# Patient Record
Sex: FEMALE | Race: WHITE | URBAN - METROPOLITAN AREA
[De-identification: names, ages, dates, MRNs, and addresses within clinical notes are randomized per-mention and may not be internally consistent; named-entity substitution may affect disease eponyms.]

---

## 2018-05-17 ENCOUNTER — HOSPITAL ENCOUNTER (OUTPATIENT)
Dept: LAB | Age: 73
Discharge: HOME | End: 2018-05-17
Payer: COMMERCIAL

## 2018-05-17 ENCOUNTER — OFFICE VISIT (OUTPATIENT)
Dept: FAMILY MEDICINE CLINIC | Age: 73
End: 2018-05-17

## 2018-05-17 VITALS
WEIGHT: 157 LBS | TEMPERATURE: 96.7 F | SYSTOLIC BLOOD PRESSURE: 122 MMHG | HEIGHT: 59 IN | RESPIRATION RATE: 20 BRPM | BODY MASS INDEX: 31.65 KG/M2 | DIASTOLIC BLOOD PRESSURE: 84 MMHG | OXYGEN SATURATION: 96 % | HEART RATE: 77 BPM

## 2018-05-17 DIAGNOSIS — H93.13 TINNITUS OF BOTH EARS: ICD-10-CM

## 2018-05-17 DIAGNOSIS — K21.9 GASTROESOPHAGEAL REFLUX DISEASE, ESOPHAGITIS PRESENCE NOT SPECIFIED: ICD-10-CM

## 2018-05-17 DIAGNOSIS — E78.5 HYPERLIPIDEMIA, UNSPECIFIED HYPERLIPIDEMIA TYPE: ICD-10-CM

## 2018-05-17 DIAGNOSIS — M81.0 AGE-RELATED OSTEOPOROSIS WITHOUT CURRENT PATHOLOGICAL FRACTURE: ICD-10-CM

## 2018-05-17 DIAGNOSIS — Z11.59 NEED FOR HEPATITIS C SCREENING TEST: ICD-10-CM

## 2018-05-17 DIAGNOSIS — F51.01 PRIMARY INSOMNIA: ICD-10-CM

## 2018-05-17 DIAGNOSIS — F41.9 ANXIETY: ICD-10-CM

## 2018-05-17 DIAGNOSIS — E53.8 VITAMIN B12 DEFICIENCY: ICD-10-CM

## 2018-05-17 DIAGNOSIS — K21.9 GASTROESOPHAGEAL REFLUX DISEASE, ESOPHAGITIS PRESENCE NOT SPECIFIED: Primary | ICD-10-CM

## 2018-05-17 DIAGNOSIS — J43.9 PULMONARY EMPHYSEMA, UNSPECIFIED EMPHYSEMA TYPE (HCC): ICD-10-CM

## 2018-05-17 DIAGNOSIS — H91.93 BILATERAL HEARING LOSS, UNSPECIFIED HEARING LOSS TYPE: ICD-10-CM

## 2018-05-17 LAB
ALBUMIN SERPL-MCNC: 4 G/DL (ref 3.4–5)
ALBUMIN/GLOB SERPL: 1.4 {RATIO} (ref 0.8–1.7)
ALP SERPL-CCNC: 71 U/L (ref 45–117)
ALT SERPL-CCNC: 23 U/L (ref 13–56)
ANION GAP SERPL CALC-SCNC: 6 MMOL/L (ref 3–18)
APPEARANCE UR: CLEAR
AST SERPL-CCNC: 16 U/L (ref 15–37)
BASOPHILS # BLD: 0 K/UL (ref 0–0.06)
BASOPHILS NFR BLD: 0 % (ref 0–2)
BILIRUB SERPL-MCNC: 0.7 MG/DL (ref 0.2–1)
BILIRUB UR QL: NEGATIVE
BUN SERPL-MCNC: 9 MG/DL (ref 7–18)
BUN/CREAT SERPL: 16 (ref 12–20)
CALCIUM SERPL-MCNC: 9.2 MG/DL (ref 8.5–10.1)
CHLORIDE SERPL-SCNC: 107 MMOL/L (ref 100–108)
CHOLEST SERPL-MCNC: 140 MG/DL
CO2 SERPL-SCNC: 28 MMOL/L (ref 21–32)
COLOR UR: YELLOW
CREAT SERPL-MCNC: 0.57 MG/DL (ref 0.6–1.3)
DIFFERENTIAL METHOD BLD: ABNORMAL
EOSINOPHIL # BLD: 0.1 K/UL (ref 0–0.4)
EOSINOPHIL NFR BLD: 1 % (ref 0–5)
ERYTHROCYTE [DISTWIDTH] IN BLOOD BY AUTOMATED COUNT: 13.3 % (ref 11.6–14.5)
GLOBULIN SER CALC-MCNC: 2.8 G/DL (ref 2–4)
GLUCOSE SERPL-MCNC: 100 MG/DL (ref 74–99)
GLUCOSE UR STRIP.AUTO-MCNC: NEGATIVE MG/DL
HCT VFR BLD AUTO: 40.9 % (ref 35–45)
HDLC SERPL-MCNC: 47 MG/DL (ref 40–60)
HDLC SERPL: 3 {RATIO} (ref 0–5)
HGB BLD-MCNC: 12.8 G/DL (ref 12–16)
HGB UR QL STRIP: NEGATIVE
KETONES UR QL STRIP.AUTO: NEGATIVE MG/DL
LDLC SERPL CALC-MCNC: 70 MG/DL (ref 0–100)
LEUKOCYTE ESTERASE UR QL STRIP.AUTO: NEGATIVE
LIPID PROFILE,FLP: NORMAL
LYMPHOCYTES # BLD: 1.2 K/UL (ref 0.9–3.6)
LYMPHOCYTES NFR BLD: 28 % (ref 21–52)
MCH RBC QN AUTO: 29.5 PG (ref 24–34)
MCHC RBC AUTO-ENTMCNC: 31.3 G/DL (ref 31–37)
MCV RBC AUTO: 94.2 FL (ref 74–97)
MONOCYTES # BLD: 0.3 K/UL (ref 0.05–1.2)
MONOCYTES NFR BLD: 7 % (ref 3–10)
NEUTS SEG # BLD: 2.6 K/UL (ref 1.8–8)
NEUTS SEG NFR BLD: 64 % (ref 40–73)
NITRITE UR QL STRIP.AUTO: NEGATIVE
PH UR STRIP: 8 [PH] (ref 5–8)
PLATELET # BLD AUTO: 367 K/UL (ref 135–420)
PMV BLD AUTO: 10.9 FL (ref 9.2–11.8)
POTASSIUM SERPL-SCNC: 4.1 MMOL/L (ref 3.5–5.5)
PROT SERPL-MCNC: 6.8 G/DL (ref 6.4–8.2)
PROT UR STRIP-MCNC: NEGATIVE MG/DL
RBC # BLD AUTO: 4.34 M/UL (ref 4.2–5.3)
SODIUM SERPL-SCNC: 141 MMOL/L (ref 136–145)
SP GR UR REFRACTOMETRY: 1.01 (ref 1–1.03)
TRIGL SERPL-MCNC: 115 MG/DL (ref ?–150)
TSH SERPL DL<=0.05 MIU/L-ACNC: 1.34 UIU/ML (ref 0.36–3.74)
UROBILINOGEN UR QL STRIP.AUTO: 1 EU/DL (ref 0.2–1)
VIT B12 SERPL-MCNC: 1766 PG/ML (ref 211–911)
VLDLC SERPL CALC-MCNC: 23 MG/DL
WBC # BLD AUTO: 4.1 K/UL (ref 4.6–13.2)

## 2018-05-17 PROCEDURE — 83013 H PYLORI (C-13) BREATH: CPT | Performed by: INTERNAL MEDICINE

## 2018-05-17 PROCEDURE — 81003 URINALYSIS AUTO W/O SCOPE: CPT | Performed by: INTERNAL MEDICINE

## 2018-05-17 PROCEDURE — 80061 LIPID PANEL: CPT | Performed by: INTERNAL MEDICINE

## 2018-05-17 PROCEDURE — 86803 HEPATITIS C AB TEST: CPT | Performed by: INTERNAL MEDICINE

## 2018-05-17 PROCEDURE — 84443 ASSAY THYROID STIM HORMONE: CPT | Performed by: INTERNAL MEDICINE

## 2018-05-17 PROCEDURE — 85025 COMPLETE CBC W/AUTO DIFF WBC: CPT | Performed by: INTERNAL MEDICINE

## 2018-05-17 PROCEDURE — 82607 VITAMIN B-12: CPT | Performed by: INTERNAL MEDICINE

## 2018-05-17 PROCEDURE — 36415 COLL VENOUS BLD VENIPUNCTURE: CPT | Performed by: INTERNAL MEDICINE

## 2018-05-17 PROCEDURE — 80053 COMPREHEN METABOLIC PANEL: CPT | Performed by: INTERNAL MEDICINE

## 2018-05-17 PROCEDURE — 82306 VITAMIN D 25 HYDROXY: CPT | Performed by: INTERNAL MEDICINE

## 2018-05-17 RX ORDER — ALPRAZOLAM 0.5 MG/1
0.5 TABLET ORAL
COMMUNITY
End: 2018-05-17 | Stop reason: SDUPTHER

## 2018-05-17 RX ORDER — GUAIFENESIN 100 MG/5ML
81 LIQUID (ML) ORAL DAILY
COMMUNITY

## 2018-05-17 RX ORDER — PANTOPRAZOLE SODIUM 40 MG/1
40 TABLET, DELAYED RELEASE ORAL DAILY
COMMUNITY
End: 2018-05-17

## 2018-05-17 RX ORDER — FLUTICASONE PROPIONATE 50 MCG
2 SPRAY, SUSPENSION (ML) NASAL DAILY
COMMUNITY
End: 2018-05-24 | Stop reason: SDUPTHER

## 2018-05-17 RX ORDER — CHROMIUM PICOLINATE 200 MCG
TABLET ORAL
COMMUNITY
End: 2018-05-17

## 2018-05-17 RX ORDER — MELATONIN
DAILY
COMMUNITY

## 2018-05-17 RX ORDER — GLUCOSAM/CHONDRO/HERB 149/HYAL 750-100 MG
1 TABLET ORAL DAILY
COMMUNITY

## 2018-05-17 RX ORDER — SIMVASTATIN 20 MG/1
20 TABLET, FILM COATED ORAL
Qty: 90 TAB | Refills: 1 | Status: SHIPPED | OUTPATIENT
Start: 2018-05-17 | End: 2018-11-08 | Stop reason: SDUPTHER

## 2018-05-17 RX ORDER — SIMVASTATIN 20 MG/1
TABLET, FILM COATED ORAL
COMMUNITY
End: 2018-05-17 | Stop reason: SDUPTHER

## 2018-05-17 RX ORDER — LANOLIN ALCOHOL/MO/W.PET/CERES
1000 CREAM (GRAM) TOPICAL DAILY
COMMUNITY
End: 2018-05-31 | Stop reason: ALTCHOICE

## 2018-05-17 RX ORDER — ALPRAZOLAM 0.5 MG/1
0.5 TABLET ORAL
Qty: 60 TAB | Refills: 2 | Status: SHIPPED | OUTPATIENT
Start: 2018-05-17 | End: 2018-05-31 | Stop reason: SDUPTHER

## 2018-05-17 RX ORDER — FLUTICASONE PROPIONATE AND SALMETEROL 250; 50 UG/1; UG/1
1 POWDER RESPIRATORY (INHALATION) EVERY 12 HOURS
COMMUNITY
End: 2018-07-23 | Stop reason: SDUPTHER

## 2018-05-17 RX ORDER — RANITIDINE 150 MG/1
150 TABLET, FILM COATED ORAL 2 TIMES DAILY
COMMUNITY
End: 2018-05-31

## 2018-05-17 RX ORDER — GABAPENTIN 100 MG/1
100 CAPSULE ORAL
COMMUNITY
End: 2018-07-06 | Stop reason: SDUPTHER

## 2018-05-17 NOTE — MR AVS SNAPSHOT
[address] [place] [id number] 
[phone] Patient: Timbo Kwong MRN: [id number] SIDRA:[birth year] Visit Information Date & Time Provider Department Dept. Phone Encounter #  
 5/17/2018 12:45 PM Monserrat Schilling, [address] [facility] [phone] Follow-up Instructions Return in about 2 weeks (around 5/31/2018) for rov. Upcoming Health Maintenance Date Due Hepatitis C Screening 1945 DTaP/Tdap/Td series (1 - Tdap) 8/1/1966 FOBT Q 1 YEAR AGE 50-75 8/1/1995 ZOSTER VACCINE AGE 60> 6/1/2005 GLAUCOMA SCREENING Q2Y 8/1/2010 Pneumococcal 65+ Low/Medium Risk (1 of 2 - PCV13) 8/1/2010 BREAST CANCER SCRN MAMMOGRAM 12/20/2012 Influenza Age 5 to Adult 8/1/2018 Allergies as of 5/17/2018  Review Complete On: 5/17/2018 By: Monserrat Schilling MD  
  
 Severity Noted Reaction Type Reactions Hydrocodone  05/17/2018    Nausea and Vomiting Penicillin G  05/17/2018    Swelling Current Immunizations  Never Reviewed No immunizations on file. Not reviewed this visit You Were Diagnosed With   
  
 Codes Comments Gastroesophageal reflux disease, esophagitis presence not specified    -  Primary ICD-10-CM: K21.9 ICD-9-CM: 530.81 Pulmonary emphysema, unspecified emphysema type ([facility] [address]     ICD-10-CM: J43.9 ICD-9-CM: 492.8 Anxiety     ICD-10-CM: F41.9 ICD-9-CM: 300.00 Primary insomnia     ICD-10-CM: F51.01 
ICD-9-CM: 307.42 Hyperlipidemia, unspecified hyperlipidemia type     ICD-10-CM: E78.5 ICD-9-CM: 272.4 Need for hepatitis C screening test     ICD-10-CM: Z11.59 
ICD-9-CM: V73.89 Age-related osteoporosis without current pathological fracture     ICD-10-CM: M81.0 ICD-9-CM: 733.01 Vitamin B12 deficiency     ICD-10-CM: E53.8 ICD-9-CM: 266.2  Tinnitus of both ears     ICD-10-CM: H93.13 
ICD-9-CM: 388.30   
 Bilateral hearing loss, unspecified hearing loss type     ICD-10-CM: H91.93 
ICD-9-CM: 389. 9 Vitals BP Pulse Temp Resp Height(growth percentile) Weight(growth percentile) 122/84 77 96.7 °F (35.9 °C) (Oral) 20 4' 11\" (1.499 m) 157 lb (71.2 kg) SpO2 BMI OB Status Smoking Status 96% 31.71 kg/m2 Hysterectomy Never Smoker BMI and BSA Data Body Mass Index Body Surface Area 31.71 kg/m 2 1.72 m 2 Preferred Pharmacy Pharmacy Name Phone [id number] [address], [address] [facility] [phone] Your Updated Medication List  
  
   
This list is accurate as of 5/17/18  1:31 PM.  Always use your most recent med list.  
  
  
  
  
 Theanmolbert Bucy 250-50 mcg/dose diskus inhaler Generic drug:  fluticasone-salmeterol Take 1 Puff by inhalation every twelve (12) hours. ALPRAZolam 0.5 mg tablet Commonly known as:  Gisele Stacie Take 1 Tab by mouth two (2) times daily as needed for Anxiety. Max Daily Amount: 1 mg.  
  
 aspirin 81 mg chewable tablet Take 81 mg by mouth daily. CALCIUM 600 PO Take  by mouth. COMBIVENT RESPIMAT  mcg/actuation inhaler Generic drug:  ipratropium-albuterol Take 1 Puff by inhalation every six (6) hours. FISH OIL 1,000 mg (120 mg-180 mg) capsule Generic drug:  omega 3-DHA-EPA-fish oil Take 1 Cap by mouth daily. fluticasone 50 mcg/actuation nasal spray Commonly known as:  Caroline Sale 2 Sprays by Both Nostrils route daily. gabapentin 100 mg capsule Commonly known as:  NEURONTIN Take 100 mg by mouth nightly. raNITIdine 150 mg tablet Commonly known as:  ZANTAC Take 150 mg by mouth two (2) times a day. simvastatin 20 mg tablet Commonly known as:  ZOCOR Take 1 Tab by mouth nightly. SPIRIVA WITH HANDIHALER 18 mcg inhalation capsule Generic drug:  tiotropium Take 1 Cap by inhalation daily. VITAMIN B-12 1,000 mcg tablet Generic drug:  cyanocobalamin Take 1,000 mcg by mouth daily. VITAMIN D3 1,000 unit tablet Generic drug:  cholecalciferol Take  by mouth daily. Prescriptions Printed Refills ALPRAZolam (XANAX) 0.5 mg tablet 2 Sig: Take 1 Tab by mouth two (2) times daily as needed for Anxiety. Max Daily Amount: 1 mg. Class: Print Route: Oral  
  
Prescriptions Sent to Pharmacy Refills  
 simvastatin (ZOCOR) 20 mg tablet 1 Sig: Take 1 Tab by mouth nightly. Class: Normal  
 Pharmacy: [address] [place] [address], [address] [place] [address], 29  #: [phone] Route: Oral  
  
Follow-up Instructions Return in about 2 weeks (around 5/31/2018) for rov. To-Do List   
 05/17/2018 Lab:  CBC WITH AUTOMATED DIFF   
  
 05/17/2018 Lab:  H. PYLORI BREATH TEST   
  
 05/17/2018 Lab:  HCV AB W/RFLX TO NARCISO   
  
 05/17/2018 Lab:  LIPID PANEL   
  
 05/17/2018 Lab:  METABOLIC PANEL, COMPREHENSIVE   
  
 05/17/2018 Lab:  TSH 3RD GENERATION Around 05/17/2018 Lab:  URINALYSIS W/ RFLX MICROSCOPIC   
  
 05/17/2018 Lab:  VITAMIN B12   
  
 05/17/2018 Lab:  VITAMIN D, 25 HYDROXY Introducing [facility]! Nathaly [facility] introduces My[employer] patient portal. Now you can access parts of your medical record, email your doctor's office, and request medication refills online. 1. In your internet browser, go to [email or web address] 2. Click on the First Time User? Click Here link in the Sign In box. You will see the New Member Sign Up page. 3. Enter your My[employer] Access Code exactly as it appears below. You will not need to use this code after youve completed the sign-up process. If you do not sign up before the expiration date, you must request a new code. · [employer] Access Code: [email or web address] Expires: 8/15/2018  1:27 PM 
 
4.  Enter the last four digits of your Social Security Number (xxxx) and Date of Birth (mm/dd/yyyy) as indicated and click Submit. You will be taken to the next sign-up page. 5. Create a [employer] ID. This will be your [employer] login ID and cannot be changed, so think of one that is secure and easy to remember. 6. Create a [employer] password. You can change your password at any time. 7. Enter your Password Reset Question and Answer. This can be used at a later time if you forget your password. 8. Enter your e-mail address. You will receive e-mail notification when new information is available in [address] 9. Click Sign Up. You can now view and download portions of your medical record. 10. Click the Download Summary menu link to download a portable copy of your medical information. If you have questions, please visit the Frequently Asked Questions section of the [employer] website. Remember, [employer] is NOT to be used for urgent needs. For medical emergencies, dial 911. Now available from your iPhone and Android! Please provide this summary of care documentation to your next provider. Your primary care clinician is listed as [place]. If you have any questions after today's visit, please call [phone].

## 2018-05-17 NOTE — PROGRESS NOTES
History of Present Illness  Charley Fernández is a 67 y.o. female who presents today for management of    Chief Complaint   Patient presents with   [address] [facility] [address] Cholesterol Problem       Patient is here to establish care. She moved from [place] 2 months ago. She currently lives with her son. Anxiety Review:  Patient is seen for anxiety disorder. Current treatment includes benzodiazepines and no other therapies. Ongoing symptoms include: insomnia. Patient denies: chest pain, shortness of breath, suicidal ideation. Reported side effects from the treatment: none. GERD  Patient complains of gastroesophageal reflux with belching. Symptoms have been present for several years. She denies dysphagia. She  has not lost weight. She denies melena, hematochezia, hematemesis, and coffee ground emesis. She denies abdominal bloating, choking on food, difficulty swallowing, dysphagia and hoarseness. Medical therapy in the past has included proton pump inhibitors, OTC H2 antagonists. COPD Review:  The patient is being seen for follow up of COPD. Oxygen: She currently is not on home oxygen therapy. Symptoms: acute dyspnea: severity = not present: course since onset of episode: well controlled  able walk 5 blocks  cough: severity: mild: sputum : clear: light. Patient uses 2 pillows at night. Patient does not smoke cigarettes. Osteoporosis / Osteopenia  She is an 67 y.o. female who presents with Osteoporosis. She has not had history of fracture. Bone density is up to date and report is not available at this time. Patient does not exercise daily. Family history is positive for osteoporosis. Patient does not smoke. Previous treatment calcium with vitamin D 1200mg daily. Tinnitus  Syeda Roque [place] complains of tinnitus in the both ears that  is related to the hearing loss. She was evaluated by an audiologist back in [address] [place] [address]. She could not afford hearing aids.     She was hospitalized in [place] 2 months ago for chest pain. Work-up was negative as reported by the patient. She takes gabapentin but is unsure for what reason. Past Medical History  Past Medical History:   Diagnosis Date    Anxiety     Emphysema lung (HCC)     GERD (gastroesophageal reflux disease)     Insomnia     Kidney stone     Osteoporosis     Varicose vein of leg         Surgical History  Past Surgical History:   Procedure Laterality Date    HX LITHOTRIPSY      HX VEIN STRIPPING Left         Current Medications  Current Outpatient Prescriptions   Medication Sig    gabapentin (NEURONTIN) 100 mg capsule Take 100 mg by mouth nightly.  aspirin 81 mg chewable tablet Take 81 mg by mouth daily.  cyanocobalamin (VITAMIN B-12) 1,000 mcg tablet Take 1,000 mcg by mouth daily.  fluticasone-salmeterol (ADVAIR DISKUS) 250-50 mcg/dose diskus inhaler Take 1 Puff by inhalation every twelve (12) hours.  tiotropium (SPIRIVA WITH HANDIHALER) 18 mcg inhalation capsule Take 1 Cap by inhalation daily.  fluticasone (FLONASE) 50 mcg/actuation nasal spray 2 Sprays by Both Nostrils route daily.  ipratropium-albuterol (COMBIVENT RESPIMAT)  mcg/actuation inhaler Take 1 Puff by inhalation every six (6) hours.  raNITIdine (ZANTAC) 150 mg tablet Take 150 mg by mouth two (2) times a day.  omega 3-DHA-EPA-fish oil (FISH OIL) 1,000 mg (120 mg-180 mg) capsule Take 1 Cap by mouth daily.  calcium carbonate (CALCIUM 600 PO) Take  by mouth.  cholecalciferol (VITAMIN D3) 1,000 unit tablet Take  by mouth daily.  ALPRAZolam (XANAX) 0.5 mg tablet Take 1 Tab by mouth two (2) times daily as needed for Anxiety. Max Daily Amount: 1 mg.  simvastatin (ZOCOR) 20 mg tablet Take 1 Tab by mouth nightly. No current facility-administered medications for this visit. Allergies/Drug Reactions  Allergies   Allergen Reactions    Hydrocodone Nausea and Vomiting    Penicillin G Swelling        Family History  No family history on file. Social History  Social History     Social History    Marital status:      Spouse name: N/A    Number of children: N/A    Years of education: N/A     Occupational History    Not on file. Social History Main Topics    Smoking status: Never Smoker    Smokeless tobacco: Never Used    Alcohol use No    Drug use: No    Sexual activity: No     Other Topics Concern    Not on file     Social History Narrative    No narrative on file       Health Maintenance   Topic Date Due    Hepatitis C Screening  1945    DTaP/Tdap/Td series (1 - Tdap) 08/01/1966    FOBT Q 1 YEAR AGE 50-75  08/01/1995    ZOSTER VACCINE AGE 60>  06/01/2005    GLAUCOMA SCREENING Q2Y  08/01/2010    Pneumococcal 65+ Low/Medium Risk (1 of 2 - PCV13) 08/01/2010    BREAST CANCER SCRN MAMMOGRAM  12/20/2012    Influenza Age 9 to Adult  08/01/2018    Bone Densitometry (Dexa) Screening  Completed       There is no immunization history on file for this patient. Review of Systems  Negative except as mentioned in HPI    Physical Exam  Vital signs:   Vitals:    05/17/18 1254   BP: 122/84   Pulse: 77   Resp: 20   Temp: 96.7 °F (35.9 °C)   TempSrc: Oral   SpO2: 96%   Weight: 157 lb (71.2 kg)   Height: 4' 11\" (1.499 m)       General: alert, oriented, not in distress  Head: scalp normal, atraumatic  Neck: supple, no JVD, no lymphadenopathy, non-palpable thyroid  Chest/Lungs: clear breath sounds, no wheezing or crackles  Heart: normal rate, regular rhythm, no murmur  Abdomen: soft, non-distended, non-tender, normal bowel sounds, no organomegaly, no masses  Extremities: no focal deformities, no edema  Skin: no active skin lesions    Laboratory/Tests:  Routine labs ordered    Assessment/Plan:    1. Gastroesophageal reflux disease, esophagitis presence not specified  - stop Prilosec  - continue Zantac 150mg BID  - H. PYLORI BREATH TEST; Future    2.  Pulmonary emphysema, unspecified emphysema type ([facility] [address]  - stable  - continue Advair, Spiriva, prn Combivent    3. Anxiety  - continue alprazolam 0.5nmg BID    4. Primary insomnia  - continue alprazolam QHS    5. Hyperlipidemia, unspecified hyperlipidemia type  - control uncertain  - continue simvastatin 20mg QHS  - CBC WITH AUTOMATED DIFF; Future  - LIPID PANEL; Future  - METABOLIC PANEL, COMPREHENSIVE; Future  - TSH 3RD GENERATION; Future  - URINALYSIS W/ RFLX MICROSCOPIC; Future    6. Need for hepatitis C screening test  - HCV AB W/RFLX TO NARCISO; Future    7. Age-related osteoporosis without current pathological fracture  - VITAMIN D, 25 HYDROXY; Future    8. Vitamin B12 deficiency  - VITAMIN B12; Future    Get records from previous PCP in [place]      Follow-up Disposition:  Return in about 2 weeks (around 5/31/2018) for rov. Total visit time was 45 minutes of which more than 50% was devoted to counseling and coordination of care. I have discussed the diagnosis with the patient and the intended plan as seen in the above orders. The patient has received an after-visit summary and questions were answered concerning future plans. I have discussed medication side effects and warnings with the patient as well. I have reviewed the plan of care with the patient, accepted their input and they are in agreement with the treatment goals.        Allen Li MD  May 17, 2018

## 2018-05-18 LAB
25(OH)D3 SERPL-MCNC: 39 NG/ML (ref 30–100)
HCV AB S/CO SERPL IA: <0.1 S/CO RATIO (ref 0–0.9)
HCV AB SERPL QL IA: NORMAL
UREA BREATH TEST QL: NEGATIVE

## 2018-05-24 DIAGNOSIS — J44.9 CHRONIC OBSTRUCTIVE PULMONARY DISEASE, UNSPECIFIED COPD TYPE (HCC): Primary | ICD-10-CM

## 2018-05-24 DIAGNOSIS — J30.9 ALLERGIC RHINITIS, UNSPECIFIED SEASONALITY, UNSPECIFIED TRIGGER: ICD-10-CM

## 2018-05-24 RX ORDER — FLUTICASONE PROPIONATE 50 MCG
2 SPRAY, SUSPENSION (ML) NASAL DAILY
Qty: 1 BOTTLE | Refills: 2 | Status: SHIPPED | OUTPATIENT
Start: 2018-05-24 | End: 2018-09-18 | Stop reason: SDUPTHER

## 2018-05-24 NOTE — TELEPHONE ENCOUNTER
Requested Prescriptions     Pending Prescriptions Disp Refills    tiotropium (SPIRIVA WITH HANDIHALER) 18 mcg inhalation capsule 30 Cap      Sig: Take 1 Cap by inhalation daily.  fluticasone (FLONASE) 50 mcg/actuation nasal spray 1 Bottle      Si Sprays by Both Nostrils route daily.      Last office visit: 18  Next office visit: 18

## 2018-05-31 ENCOUNTER — OFFICE VISIT (OUTPATIENT)
Dept: FAMILY MEDICINE CLINIC | Age: 73
End: 2018-05-31

## 2018-05-31 VITALS
SYSTOLIC BLOOD PRESSURE: 138 MMHG | TEMPERATURE: 98 F | RESPIRATION RATE: 20 BRPM | HEIGHT: 59 IN | OXYGEN SATURATION: 98 % | BODY MASS INDEX: 31.04 KG/M2 | HEART RATE: 75 BPM | DIASTOLIC BLOOD PRESSURE: 90 MMHG | WEIGHT: 154 LBS

## 2018-05-31 DIAGNOSIS — J43.9 PULMONARY EMPHYSEMA, UNSPECIFIED EMPHYSEMA TYPE (HCC): Primary | ICD-10-CM

## 2018-05-31 DIAGNOSIS — Z79.899 CHRONICALLY ON BENZODIAZEPINE THERAPY: ICD-10-CM

## 2018-05-31 DIAGNOSIS — M81.0 AGE-RELATED OSTEOPOROSIS WITHOUT CURRENT PATHOLOGICAL FRACTURE: ICD-10-CM

## 2018-05-31 DIAGNOSIS — K21.9 GASTROESOPHAGEAL REFLUX DISEASE, ESOPHAGITIS PRESENCE NOT SPECIFIED: ICD-10-CM

## 2018-05-31 DIAGNOSIS — Z79.899 CONTROLLED SUBSTANCE AGREEMENT SIGNED: ICD-10-CM

## 2018-05-31 DIAGNOSIS — I10 ESSENTIAL HYPERTENSION: ICD-10-CM

## 2018-05-31 DIAGNOSIS — F41.9 ANXIETY: ICD-10-CM

## 2018-05-31 DIAGNOSIS — F51.01 PRIMARY INSOMNIA: ICD-10-CM

## 2018-05-31 RX ORDER — PANTOPRAZOLE SODIUM 40 MG/1
40 TABLET, DELAYED RELEASE ORAL DAILY
COMMUNITY
End: 2018-06-28 | Stop reason: SDUPTHER

## 2018-05-31 RX ORDER — ALPRAZOLAM 0.5 MG/1
0.5 TABLET ORAL
Qty: 60 TAB | Refills: 0
Start: 2018-05-31

## 2018-05-31 RX ORDER — LOSARTAN POTASSIUM 100 MG/1
100 TABLET ORAL DAILY
COMMUNITY

## 2018-05-31 RX ORDER — MONTELUKAST SODIUM 10 MG/1
10 TABLET ORAL DAILY
COMMUNITY
End: 2018-07-23 | Stop reason: SDUPTHER

## 2018-05-31 NOTE — PROGRESS NOTES
1. Have you been to the ER, urgent care clinic since your last visit? Hospitalized since your last visit? No    2. Have you seen or consulted any other health care providers outside of the [address] [place] [address] since your last visit? Include any pap smears or colon screening.  No

## 2018-05-31 NOTE — PROGRESS NOTES
History of Present Illness  Isidro Ortega is a 67 y.o. female who presents today for management of    Chief Complaint   Patient presents with    COPD    GERD     Fall  Patient reports of falling on her right side few days ago while putting a box in a garbage can. She fell while stepping on a cardboard. She had right wrist swelling which has complete resolved. She has persistent pain on her right forearm. No history of prior falls. Anxiety Review:  Patient is seen for anxiety disorder. Current treatment includes benzodiazepines and no other therapies. Ongoing symptoms include: insomnia. Patient denies: chest pain, shortness of breath, suicidal ideation. Reported side effects from the treatment: none. Failed therapies for insomnia: benadryl, melatonin     GERD  Patient complains of gastroesophageal reflux with belching. Symptoms have been present for several years. She denies dysphagia. She  has not lost weight. She denies melena, hematochezia, hematemesis, and coffee ground emesis. She denies abdominal bloating, choking on food, difficulty swallowing, dysphagia and hoarseness. Medical therapy in the past has included proton pump inhibitors, OTC H2 antagonists.     COPD Review:  The patient is being seen for follow up of COPD. Oxygen: She currently is not on home oxygen therapy. Symptoms: acute dyspnea: severity = not present: course since onset of episode: well controlled  able walk 5 blocks  cough: severity: mild: sputum : clear: light. Patient uses 2 pillows at night. Patient does not smoke cigarettes.     Osteoporosis / Osteopenia  She is an 67 y.o. female who presents with Osteoporosis. She has not had history of fracture. Bone density is up to date and report is not available at this time. Patient does not exercise daily. Family history is positive for osteoporosis. Patient does not smoke. Previous treatment calcium with vitamin D 1200mg daily.     Cardiovascular Review:  The patient has hypertension and hyperlipidemia. Diet and Lifestyle: generally follows a low fat low cholesterol diet, generally follows a low sodium diet, sedentary, nonsmoker  Home BP Monitoring: is not measured at home. Pertinent ROS: taking medications as instructed, no medication side effects noted, no TIA's, no chest pain on exertion, no dyspnea on exertion, no swelling of ankles. Problem List  Patient Active Problem List    Diagnosis Date Noted    Primary insomnia 05/17/2018    Gastroesophageal reflux disease 05/17/2018    Pulmonary emphysema ([address] 05/17/2018    Anxiety 05/17/2018    Age-related osteoporosis without current pathological fracture 05/17/2018    Bilateral hearing loss 05/17/2018    Tinnitus of both ears 05/17/2018       Past Medical History  Past Medical History:   Diagnosis Date    Anxiety     Emphysema lung ([address]     GERD (gastroesophageal reflux disease)     Insomnia     Kidney stone     Osteoporosis     Varicose vein of leg         Surgical History  Past Surgical History:   Procedure Laterality Date    HX LITHOTRIPSY      HX VEIN STRIPPING Left         Current Medications  Current Outpatient Prescriptions   Medication Sig    losartan (COZAAR) 100 mg tablet Take 100 mg by mouth daily.  montelukast (SINGULAIR) 10 mg tablet Take 10 mg by mouth daily.  ALPRAZolam (XANAX) 0.5 mg tablet Take 1 Tab by mouth nightly as needed for Sleep. Max Daily Amount: 0.5 mg.    tiotropium (SPIRIVA WITH HANDIHALER) 18 mcg inhalation capsule Take 1 Cap by inhalation daily.  fluticasone (FLONASE) 50 mcg/actuation nasal spray 2 Sprays by Both Nostrils route daily.  gabapentin (NEURONTIN) 100 mg capsule Take 100 mg by mouth nightly. Indications: TMJ    aspirin 81 mg chewable tablet Take 81 mg by mouth daily.  fluticasone-salmeterol (ADVAIR DISKUS) 250-50 mcg/dose diskus inhaler Take 1 Puff by inhalation every twelve (12) hours.     ipratropium-albuterol (COMBIVENT RESPIMAT)  mcg/actuation inhaler Take 1 Puff by inhalation every six (6) hours.  omega 3-DHA-EPA-fish oil (FISH OIL) 1,000 mg (120 mg-180 mg) capsule Take 1 Cap by mouth daily.  calcium carbonate (CALCIUM 600 PO) Take  by mouth.  cholecalciferol (VITAMIN D3) 1,000 unit tablet Take  by mouth daily.  simvastatin (ZOCOR) 20 mg tablet Take 1 Tab by mouth nightly.  pantoprazole (PROTONIX) 40 mg tablet Take 40 mg by mouth daily. No current facility-administered medications for this visit. Allergies/Drug Reactions  Allergies   Allergen Reactions    Hydrocodone Nausea and Vomiting    Penicillin G Swelling        Family History  No family history on file. Social History  Social History     Social History    Marital status:      Spouse name: N/A    Number of children: N/A    Years of education: N/A     Occupational History    Not on file.      Social History Main Topics    Smoking status: Never Smoker    Smokeless tobacco: Never Used    Alcohol use No    Drug use: No    Sexual activity: No     Other Topics Concern    Not on file     Social History Narrative    Nickname: Red       Review of Systems  Negative except as mentioned in HPI      Physical Exam  Vital signs:   Vitals:    05/31/18 1058 05/31/18 1115   BP: 151/82 138/90   Pulse: 75    Resp: 20    Temp: 98 °F (36.7 °C)    TempSrc: Oral    SpO2: 98%    Weight: 154 lb (69.9 kg)    Height: 4' 11\" (1.499 m)        General: alert, oriented, not in distress  Chest/Lungs: clear breath sounds, no wheezing or crackles  Heart: normal rate, regular rhythm, no murmur  Abdomen: soft, non-distended, non-tender, normal bowel sounds, no organomegaly, no masses  Extremities: no focal deformities, no edema    Laboratory/Tests:  Component      Latest Ref Rng & Units 5/17/2018 5/17/2018 5/17/2018           1:33 PM  1:33 PM  1:33 PM   WBC      4.6 - 13.2 K/uL   4.1 (L)   RBC      4.20 - 5.30 M/uL   4.34   HGB      12.0 - 16.0 g/dL   12.8   HCT      35.0 - 45.0 %   40.9   MCV      74.0 - 97.0 FL   94.2   MCH      24.0 - 34.0 PG   29.5   MCHC      31.0 - 37.0 g/dL   31.3   RDW      11.6 - 14.5 %   13.3   PLATELET      240 - 138 K/uL   367   MPV      9.2 - 11.8 FL   10.9   NEUTROPHILS      40 - 73 %   64   LYMPHOCYTES      21 - 52 %   28   MONOCYTES      3 - 10 %   7   EOSINOPHILS      0 - 5 %   1   BASOPHILS      0 - 2 %   0   ABS. NEUTROPHILS      1.8 - 8.0 K/UL   2.6   ABS. LYMPHOCYTES      0.9 - 3.6 K/UL   1.2   ABS. MONOCYTES      0.05 - 1.2 K/UL   0.3   ABS. EOSINOPHILS      0.0 - 0.4 K/UL   0.1   ABS. BASOPHILS      0.0 - 0.06 K/UL   0.0   DF         AUTOMATED   Cholesterol, total      <200 MG/DL  140    Triglyceride      <150 MG/DL  115    HDL Cholesterol      40 - 60 MG/DL  47    LDL, calculated      0 - 100 MG/DL  70    VLDL, calculated      MG/DL  23    CHOL/HDL Ratio      0 - 5.0    3.0    HCV Ab      0.0 - 0.9 s/co ratio <0.1       Component      Latest Ref Rng & Units 5/17/2018 5/17/2018 5/17/2018 5/17/2018           1:33 PM  1:33 PM  1:33 PM  1:33 PM   Sodium      136 - 145 mmol/L   141    Potassium      3.5 - 5.5 mmol/L   4.1    Chloride      100 - 108 mmol/L   107    CO2      21 - 32 mmol/L   28    Anion gap      3.0 - 18 mmol/L   6    Glucose      74 - 99 mg/dL   100 (H)    BUN      7.0 - 18 MG/DL   9    Creatinine      0.6 - 1.3 MG/DL   0.57 (L)    BUN/Creatinine ratio      12 - 20     16    GFR est AA      >60 ml/min/1.73m2   >60    GFR est non-AA      >60 ml/min/1.73m2   >60    Calcium      8.5 - 10.1 MG/DL   9.2    Bilirubin, total      0.2 - 1.0 MG/DL   0.7    ALT (SGPT)      13 - 56 U/L   23    AST      15 - 37 U/L   16    Alk.  phosphatase      45 - 117 U/L   71    Protein, total      6.4 - 8.2 g/dL   6.8    Albumin      3.4 - 5.0 g/dL   4.0    Globulin      2.0 - 4.0 g/dL   2.8    A-G Ratio      0.8 - 1.7     1.4    Color          YELLOW   Appearance          CLEAR   Specific gravity      1.005 - 1.030      1.010   pH (UA)      5.0 - 8.0 8.0   Protein      NEG mg/dL    NEGATIVE   Glucose      NEG mg/dL    NEGATIVE   Ketone      NEG mg/dL    NEGATIVE   Bilirubin      NEG      NEGATIVE   Blood      NEG      NEGATIVE   Urobilinogen      0.2 - 1.0 EU/dL    1.0   Nitrites      NEG      NEGATIVE   Leukocyte Esterase      NEG      NEGATIVE   TSH      0.36 - 3.74 uIU/mL  1.34     Vitamin B12      211 - 911 pg/mL 1766 (H)        Component      Latest Ref Rng & Units 5/17/2018 5/17/2018           1:33 PM  1:33 PM   Vitamin D 25-Hydroxy      30 - 100 ng/mL  39.0   H. pylori Breath Test      NEGATIVE   NEGATIVE        Assessment/Plan:    1. Pulmonary emphysema, unspecified emphysema type ([address]  - stable  - continue Advair, Spiriva and prn Combivent    2. Anxiety  - ALPRAZolam (XANAX) 0.5 mg tablet; Take 1 Tab by mouth nightly as needed for Sleep. Max Daily Amount: 0.5 mg.  Dispense: 60 Tab; Refill: 0    3. Primary insomnia  - ALPRAZolam (XANAX) 0.5 mg tablet; Take 1 Tab by mouth nightly as needed for Sleep. Max Daily Amount: 0.5 mg.  Dispense: 60 Tab; Refill: 0    4. Gastroesophageal reflux disease, esophagitis presence not specified  - stop Zantac  - stop PPI. Resume if symptoms recur. 5. Age-related osteoporosis without current pathological fracture  - continue calcium with vitamin D  - had bone density scan last year - done in [facility]    6. Controlled substance agreement signed  - [facility]; Future    7. Chronically on benzodiazepine therapy  - COMPLIANCE DRUG SCREEN/PRESCRIPTION MONITORING; Future    8. Essential hypertension  - BP fairly controlled  - continue losartan  - low salt diet      Follow-up Disposition:  Return in about 3 months (around 8/31/2018) for rov. I have discussed the diagnosis with the patient and the intended plan as seen in the above orders. The patient has received an after-visit summary and questions were answered concerning future plans.   I have discussed medication side effects and warnings with the patient as well. I have reviewed the plan of care with the patient, accepted their input and they are in agreement with the treatment goals.        Arnoldo Henao MD  May 31, 2018

## 2018-05-31 NOTE — MR AVS SNAPSHOT
[address] [place] [id number] 
[phone] Patient: Michelle Davis MRN: [id number] EGE:[birth year] Visit Information Date & Time Provider Department Dept. Phone Encounter #  
 5/31/2018 10:45 AM Luis Rosado, Filippo [facility] [phone] [id number] Follow-up Instructions Return in about 3 months (around 8/31/2018) for rov. Upcoming Health Maintenance Date Due DTaP/Tdap/Td series (1 - Tdap) 8/1/1966 FOBT Q 1 YEAR AGE 50-75 8/1/1995 ZOSTER VACCINE AGE 60> 6/1/2005 GLAUCOMA SCREENING Q2Y 8/1/2010 Pneumococcal 65+ Low/Medium Risk (1 of 2 - PCV13) 8/1/2010 BREAST CANCER SCRN MAMMOGRAM 12/20/2012 Influenza Age 5 to Adult 8/1/2018 Allergies as of 5/31/2018  Review Complete On: 5/31/2018 By: Luis Rosado MD  
  
 Severity Noted Reaction Type Reactions Hydrocodone  05/17/2018    Nausea and Vomiting Penicillin G  05/17/2018    Swelling Current Immunizations  Never Reviewed No immunizations on file. Not reviewed this visit You Were Diagnosed With   
  
 Codes Comments Pulmonary emphysema, unspecified emphysema type ([facility]    -  Primary ICD-10-CM: J43.9 ICD-9-CM: 492.8 Anxiety     ICD-10-CM: F41.9 ICD-9-CM: 300.00 Primary insomnia     ICD-10-CM: F51.01 
ICD-9-CM: 307.42 Gastroesophageal reflux disease, esophagitis presence not specified     ICD-10-CM: K21.9 ICD-9-CM: 530.81 Age-related osteoporosis without current pathological fracture     ICD-10-CM: M81.0 ICD-9-CM: 733.01 Controlled substance agreement signed     ICD-10-CM: Z79.899 ICD-9-CM: V58.69 Chronically on benzodiazepine therapy     ICD-10-CM: Z79.899 ICD-9-CM: V58.69 Essential hypertension     ICD-10-CM: I10 
ICD-9-CM: 401.9 Vitals BP Pulse Temp Resp Height(growth percentile) Weight(growth percentile) 138/90 75 98 °F (36.7 °C) (Oral) 20 4' 11\" (1.499 m) 154 lb (69.9 kg) SpO2 BMI OB Status Smoking Status 98% 31.1 kg/m2 Hysterectomy Never Smoker Vitals History BMI and BSA Data Body Mass Index Body Surface Area  
 31.1 kg/m 2 1.71 m 2 Preferred Pharmacy Pharmacy Name Phone [id number]- [id number], [address] [place] [phone] Your Updated Medication List  
  
   
This list is accurate as of 5/31/18 11:24 AM.  Always use your most recent med list.  
  
  
  
  
 [place] 250-50 mcg/dose diskus inhaler Generic drug:  fluticasone-salmeterol Take 1 Puff by inhalation every twelve (12) hours. ALPRAZolam 0.5 mg tablet Commonly known as:  Emil Batesks Take 1 Tab by mouth nightly as needed for Sleep. Max Daily Amount: 0.5 mg.  
  
 aspirin 81 mg chewable tablet Take 81 mg by mouth daily. CALCIUM 600 PO Take  by mouth. COMBIVENT RESPIMAT  mcg/actuation inhaler Generic drug:  ipratropium-albuterol Take 1 Puff by inhalation every six (6) hours. FISH OIL 1,000 mg (120 mg-180 mg) capsule Generic drug:  omega 3-DHA-EPA-fish oil Take 1 Cap by mouth daily. fluticasone 50 mcg/actuation nasal spray Commonly known as:  Francella Lacks 2 Sprays by Both Nostrils route daily. gabapentin 100 mg capsule Commonly known as:  NEURONTIN Take 100 mg by mouth nightly. Indications: TMJ  
  
 losartan 100 mg tablet Commonly known as:  COZAAR Take 100 mg by mouth daily. montelukast 10 mg tablet Commonly known as:  SINGULAIR Take 10 mg by mouth daily. pantoprazole 40 mg tablet Commonly known as:  PROTONIX Take 40 mg by mouth daily. simvastatin 20 mg tablet Commonly known as:  ZOCOR Take 1 Tab by mouth nightly. tiotropium 18 mcg inhalation capsule Commonly known as:  [address] Take 1 Cap by inhalation daily. VITAMIN D3 1,000 unit tablet Generic drug:  cholecalciferol Take  by mouth daily. Follow-up Instructions Return in about 3 months (around 8/31/2018) for rov. To-Do List   
 05/31/2018 Lab:  COMPLIANCE DRUG SCREEN/PRESCRIPTION MONITORING Introducing [facility] SERVICES! Iselakia Brooklynn introduces [employer] patient portal. Now you can access parts of your medical record, email your doctor's office, and request medication refills online. 1. In your internet browser, go to [email or web address] 2. Click on the First Time User? Click Here link in the Sign In box. You will see the New Member Sign Up page. 3. Enter your [employer] Access Code exactly as it appears below. You will not need to use this code after youve completed the sign-up process. If you do not sign up before the expiration date, you must request a new code. · [employer] Access Code: [email or web address] Expires: 8/15/2018  1:27 PM 
 
4. Enter the last four digits of your Social Security Number (xxxx) and Date of Birth (mm/dd/yyyy) as indicated and click Submit. You will be taken to the next sign-up page. 5. Create a [employer] ID. This will be your [employer] login ID and cannot be changed, so think of one that is secure and easy to remember. 6. Create a [employer] password. You can change your password at any time. 7. Enter your Password Reset Question and Answer. This can be used at a later time if you forget your password. 8. Enter your e-mail address. You will receive e-mail notification when new information is available in [address] 9. Click Sign Up. You can now view and download portions of your medical record. 10. Click the Download Summary menu link to download a portable copy of your medical information. If you have questions, please visit the Frequently Asked Questions section of the [employer] website. Remember, [employer] is NOT to be used for urgent needs. For medical emergencies, dial 911. Now available from your iPhone and Android! Please provide this summary of care documentation to your next provider. Your primary care clinician is listed as Leila Ricardo. If you have any questions after today's visit, please call [phone].

## 2018-07-06 DIAGNOSIS — M26.609 TMJ (TEMPOROMANDIBULAR JOINT DISORDER): Primary | ICD-10-CM

## 2018-07-06 RX ORDER — GABAPENTIN 100 MG/1
100 CAPSULE ORAL
Qty: 90 CAP | Refills: 1 | Status: SHIPPED | OUTPATIENT
Start: 2018-07-06 | End: 2019-08-08 | Stop reason: SDUPTHER

## 2018-07-06 NOTE — TELEPHONE ENCOUNTER
Pt states that she only has two tablets left. Pt would like a call back when this is completed.  Please assist.

## 2018-09-13 DIAGNOSIS — F41.9 ANXIETY: ICD-10-CM

## 2018-09-14 RX ORDER — ALPRAZOLAM 0.5 MG/1
0.5 TABLET ORAL
Qty: 60 TAB | Refills: 0 | OUTPATIENT
Start: 2018-09-14

## 2018-09-14 NOTE — TELEPHONE ENCOUNTER
Requested Prescriptions     Pending Prescriptions Disp Refills    ALPRAZolam (XANAX) 0.5 mg tablet 60 Tab 0     Sig: Take 1 Tab by mouth nightly as needed for Sleep. Max Daily Amount: 0.5 mg.      Last office visit: 5/31/18  Next office visit: 8/30/18      Thank you

## 2018-09-17 NOTE — TELEPHONE ENCOUNTER
Nurse called patient, patient advised that she changed providers. Refill is not needed. This encounter will be closed.

## 2018-09-18 DIAGNOSIS — J30.9 ALLERGIC RHINITIS, UNSPECIFIED SEASONALITY, UNSPECIFIED TRIGGER: ICD-10-CM

## 2018-09-18 RX ORDER — FLUTICASONE PROPIONATE 50 MCG
2 SPRAY, SUSPENSION (ML) NASAL DAILY
Qty: 1 BOTTLE | Refills: 2 | Status: SHIPPED | OUTPATIENT
Start: 2018-09-18

## 2018-10-03 ENCOUNTER — TELEPHONE (OUTPATIENT)
Dept: SURGERY | Age: 73
End: 2018-10-03

## 2018-10-03 NOTE — TELEPHONE ENCOUNTER
1st call--LVM to schedule with SARITA Cisse for colon screening consult per rapid fax Dr. Burak Duran.

## 2018-11-08 DIAGNOSIS — E78.5 HYPERLIPIDEMIA, UNSPECIFIED HYPERLIPIDEMIA TYPE: ICD-10-CM

## 2018-11-08 RX ORDER — SIMVASTATIN 20 MG/1
20 TABLET, FILM COATED ORAL
Qty: 90 TAB | Refills: 1 | Status: SHIPPED | OUTPATIENT
Start: 2018-11-08

## 2019-08-08 DIAGNOSIS — M26.609 TMJ (TEMPOROMANDIBULAR JOINT DISORDER): ICD-10-CM

## 2019-08-09 RX ORDER — GABAPENTIN 100 MG/1
CAPSULE ORAL
Qty: 90 CAP | Refills: 0 | Status: SHIPPED | OUTPATIENT
Start: 2019-08-09

## 2019-08-09 NOTE — TELEPHONE ENCOUNTER
Medication requesting   Requested Prescriptions     Pending Prescriptions Disp Refills    gabapentin (NEURONTIN) 100 mg capsule [Pharmacy Med Name: GABAPENTIN 100 MG CAPSULE] 90 Cap 0     Sig: TAKE 1 CAP BY MOUTH NIGHTLY.        Date patient last seen 5/31/2018    Follow up appt scheduled for not scheduled

## 2019-08-12 ENCOUNTER — DOCUMENTATION ONLY (OUTPATIENT)
Dept: FAMILY MEDICINE CLINIC | Age: 74
End: 2019-08-12

## 2019-08-12 NOTE — PROGRESS NOTES
Rx for Gabapentin printed and signed by Dr. Sunshine Gallo per refill request. Called and spoke with pt's daughter, pt currently living in [place]. Advised her that I will shred Gabapentin rx. She verbalized understanding.

## 2020-05-28 LAB — HBA1C MFR BLD HPLC: 5.6 %

## 2020-07-08 ENCOUNTER — OFFICE VISIT (OUTPATIENT)
Dept: OBGYN CLINIC | Facility: CLINIC | Age: 75
End: 2020-07-08
Payer: COMMERCIAL

## 2020-07-08 ENCOUNTER — TELEPHONE (OUTPATIENT)
Dept: OBGYN CLINIC | Facility: CLINIC | Age: 75
End: 2020-07-08

## 2020-07-08 VITALS
DIASTOLIC BLOOD PRESSURE: 82 MMHG | HEART RATE: 93 BPM | HEIGHT: 60 IN | SYSTOLIC BLOOD PRESSURE: 154 MMHG | BODY MASS INDEX: 31.41 KG/M2 | WEIGHT: 160 LBS

## 2020-07-08 DIAGNOSIS — M75.101 ROTATOR CUFF SYNDROME OF RIGHT SHOULDER: Primary | ICD-10-CM

## 2020-07-08 PROCEDURE — 99203 OFFICE O/P NEW LOW 30 MIN: CPT | Performed by: ORTHOPAEDIC SURGERY

## 2020-07-08 PROCEDURE — 20610 DRAIN/INJ JOINT/BURSA W/O US: CPT | Performed by: ORTHOPAEDIC SURGERY

## 2020-07-08 RX ORDER — SIMVASTATIN 20 MG
20 TABLET ORAL
COMMUNITY

## 2020-07-08 RX ORDER — METHYLPREDNISOLONE ACETATE 40 MG/ML
1 INJECTION, SUSPENSION INTRA-ARTICULAR; INTRALESIONAL; INTRAMUSCULAR; SOFT TISSUE
Status: COMPLETED | OUTPATIENT
Start: 2020-07-08 | End: 2020-07-08

## 2020-07-08 RX ORDER — ACETAMINOPHEN 325 MG/1
650 TABLET ORAL EVERY 6 HOURS PRN
COMMUNITY

## 2020-07-08 RX ORDER — LIDOCAINE HYDROCHLORIDE 10 MG/ML
4 INJECTION, SOLUTION INFILTRATION; PERINEURAL
Status: COMPLETED | OUTPATIENT
Start: 2020-07-08 | End: 2020-07-08

## 2020-07-08 RX ORDER — ALENDRONATE SODIUM 70 MG/1
70 TABLET ORAL
COMMUNITY
Start: 2020-05-31

## 2020-07-08 RX ORDER — GABAPENTIN 100 MG/1
100 CAPSULE ORAL 3 TIMES DAILY
COMMUNITY

## 2020-07-08 RX ORDER — MONTELUKAST SODIUM 10 MG/1
10 TABLET ORAL DAILY
COMMUNITY
Start: 2020-05-28

## 2020-07-08 RX ORDER — ALBUTEROL SULFATE 90 UG/1
AEROSOL, METERED RESPIRATORY (INHALATION)
COMMUNITY
Start: 2020-05-29

## 2020-07-08 RX ADMIN — METHYLPREDNISOLONE ACETATE 1 ML: 40 INJECTION, SUSPENSION INTRA-ARTICULAR; INTRALESIONAL; INTRAMUSCULAR; SOFT TISSUE at 11:36

## 2020-07-08 RX ADMIN — LIDOCAINE HYDROCHLORIDE 4 ML: 10 INJECTION, SOLUTION INFILTRATION; PERINEURAL at 11:36

## 2020-07-08 NOTE — TELEPHONE ENCOUNTER
RECEIVED FAXED REFERRAL TO SCHEDULE AN APPOINTMENT FOR RIGHT SHOULDER PAIN WITH DR Jena Patton  CALLED AND LVM FOR PATIENT TO RETURN OUR CALL FOR SCHEDULING

## 2020-07-08 NOTE — PROGRESS NOTES
Patient Name:  Romeo Pelayo  MRN:  [id number]    Assessment & Plan     Right shoulder rotator cuff tendinitis/bursitis  1  Corticosteroid injection performed today into the right shoulder subacromial space  2  Activities as tolerated modification to avoid pain  3  Follow-up as needed  Chief Complaint     Right shoulder pain    History of the Present Illness     Romeo Pelayo is a 76 y o  female who reports to the office today for initial evaluation of his right shoulder  She notes a two month history of right shoulder pain  She denies any injury or trauma  She states the pain is localized to the lateral aspect shoulder  Is worse with reaching her back and overhead  She denies any weakness or instability  She denies any stiffness  No numbness or tingling  No fevers or chills  She notes a similar episode occurred one year ago which was treated successfully with a corticosteroid injection  Physical Exam     /82 (BP Location: Left arm, Patient Position: Sitting, Cuff Size: Adult)   Pulse 93   Ht 5' (1 524 m)   Wt 72 6 kg (160 lb)   BMI 31 25 kg/m²     Right shoulder: No Gross deformity  No tenderness to palpation  PROM includes , ER-abd 90, IR-abd 50  Positive Kaye impingement test   Pain but no weakness with empty can test   Negative speed's test   Negative cross-body adduction test     Eyes:  Anicteric sclerae  Neck:  Supple  Lungs:  Normal respiratory effort  Cardiovascular:  Capillary refill is less than 2 seconds  Skin:  Intact without erythema  Neurologic:  Sensation intact to light touch  Psychiatric:  Mood and affect are appropriate  History reviewed  No pertinent past medical history      Past Surgical History:   Procedure Laterality Date    HYSTERECTOMY         Allergies   Allergen Reactions    Penicillins Swelling and Rash       Current Outpatient Medications on File Prior to Visit   Medication Sig Dispense Refill    acetaminophen (TYLENOL) 325 mg tablet Take 650 mg by mouth every 6 (six) hours as needed for mild pain      albuterol (PROVENTIL HFA,VENTOLIN HFA) 90 mcg/act inhaler 2 PUFFS EVERY 4 6 HRS AS NEEDED INHALATION 30 DAYS      alendronate (FOSAMAX) 70 mg tablet Take 70 mg by mouth weekly before breakfast      gabapentin (NEURONTIN) 100 mg capsule Take 100 mg by mouth 3 (three) times a day      montelukast (SINGULAIR) 10 mg tablet Take 10 mg by mouth daily      simvastatin (ZOCOR) 20 mg tablet Take 20 mg by mouth daily at bedtime      WIXELA INHUB 250-50 MCG/DOSE inhaler TAKE 1 PUFF BY MOUTH TWICE A DAY       No current facility-administered medications on file prior to visit  Social History     Tobacco Use    Smoking status: Never Smoker    Smokeless tobacco: Never Used   Substance Use Topics    Alcohol use: Not on file    Drug use: Not on file       History reviewed  No pertinent family history  Review of Systems     As stated in the HPI  All other systems were reviewed and are negative        Large joint arthrocentesis: R subacromial bursa  Date/Time: 7/8/2020 11:36 AM  Procedure Details  Location: shoulder - R subacromial bursa  Needle size: 22 G  Ultrasound guidance: no  Approach: lateral  Medications administered: 4 mL lidocaine 1 %; 1 mL methylPREDNISolone acetate 40 mg/mL    Patient tolerance: patient tolerated the procedure well with no immediate complications  Dressing:  Sterile dressing applied          Scribe Attestation    I,:   Tonja Painter PA-C am acting as a scribe while in the presence of the attending physician :        I,:   Negro Gentile MD personally performed the services described in this documentation    as scribed in my presence :

## 2020-07-24 ENCOUNTER — OFFICE VISIT (OUTPATIENT)
Dept: OBGYN CLINIC | Facility: CLINIC | Age: 75
End: 2020-07-24
Payer: COMMERCIAL

## 2020-07-24 VITALS
DIASTOLIC BLOOD PRESSURE: 83 MMHG | WEIGHT: 160 LBS | HEIGHT: 60 IN | HEART RATE: 96 BPM | BODY MASS INDEX: 31.41 KG/M2 | SYSTOLIC BLOOD PRESSURE: 144 MMHG

## 2020-07-24 DIAGNOSIS — M75.101 ROTATOR CUFF SYNDROME OF RIGHT SHOULDER: Primary | ICD-10-CM

## 2020-07-24 PROCEDURE — 99213 OFFICE O/P EST LOW 20 MIN: CPT | Performed by: ORTHOPAEDIC SURGERY

## 2020-07-24 RX ORDER — METHYLPREDNISOLONE 4 MG/1
TABLET ORAL
Qty: 21 TABLET | Refills: 0 | Status: SHIPPED | OUTPATIENT
Start: 2020-07-24

## 2020-07-24 NOTE — PROGRESS NOTES
Patient Name:  Roge Galarza  MRN:  [id number]    Assessment & Plan     Right shoulder rotator cuff tendinitis/bursitis with mild residual discomfort after subacromial steroid injection 7/8/20  1  Prescription for Medrol Dosepak  2  Activities as tolerated with modification to avoid pain  3  Follow-up as needed  History of the Present Illness     66-year-old female returns to the office today for follow-up regarding her right shoulder  She was initially seen on 7/8/20  At that time she received a corticosteroid injection into the right shoulder subacromial space  She notes significant improvement after undergoing the injection however still notes mild residual soreness  Persistent discomfort is localized to the lateral aspect and is worse with reaching behind her back and overhead  No weakness or instability  She takes aspirin for pain  General ROS:  Negative for fever or chills  Neurological ROS:  Negative for numbness or tingling  Physical Exam     /83   Pulse 96   Ht 5' (1 524 m)   Wt 72 6 kg (160 lb)   BMI 31 25 kg/m²     Right Shoulder: No TTP  PROM includes , ER-abd 5, IR-abd 30  Positive Kaye impingement test   Negative empty can test   Mildly positive Speed's test   Negative cross-body adduction test  Sensation intact and nerves  2+ radial pulse  Appropriate mood and affect        Social History     Tobacco Use    Smoking status: Never Smoker    Smokeless tobacco: Never Used   Substance Use Topics    Alcohol use: Not on file    Drug use: Not on file       Scribe Attestation    I,:   Georgia Lawton PA-C am acting as a scribe while in the presence of the attending physician :        I,:   Lindsay Lugo MD personally performed the services described in this documentation    as scribed in my presence :